# Patient Record
Sex: MALE | URBAN - METROPOLITAN AREA
[De-identification: names, ages, dates, MRNs, and addresses within clinical notes are randomized per-mention and may not be internally consistent; named-entity substitution may affect disease eponyms.]

---

## 2023-05-01 ENCOUNTER — APPOINTMENT (OUTPATIENT)
Dept: URBAN - METROPOLITAN AREA CLINIC 197 | Age: 73
Setting detail: DERMATOLOGY
End: 2023-05-02

## 2023-05-01 DIAGNOSIS — L82.1 OTHER SEBORRHEIC KERATOSIS: ICD-10-CM

## 2023-05-01 DIAGNOSIS — L08.9 LOCAL INFECTION OF THE SKIN AND SUBCUTANEOUS TISSUE, UNSPECIFIED: ICD-10-CM

## 2023-05-01 DIAGNOSIS — L92.3 FOREIGN BODY GRANULOMA OF THE SKIN AND SUBCUTANEOUS TISSUE: ICD-10-CM

## 2023-05-01 PROBLEM — D48.5 NEOPLASM OF UNCERTAIN BEHAVIOR OF SKIN: Status: ACTIVE | Noted: 2023-05-01

## 2023-05-01 PROCEDURE — 99203 OFFICE O/P NEW LOW 30 MIN: CPT | Mod: 25

## 2023-05-01 PROCEDURE — 10120 INC&RMVL FB SUBQ TISS SMPL: CPT

## 2023-05-01 PROCEDURE — OTHER MIPS QUALITY: OTHER

## 2023-05-01 PROCEDURE — OTHER FOREIGN BODY REMOVAL WITH PATHOLOGY: OTHER

## 2023-05-01 PROCEDURE — OTHER PRESCRIPTION MEDICATION MANAGEMENT: OTHER

## 2023-05-01 PROCEDURE — OTHER PRESCRIPTION: OTHER

## 2023-05-01 PROCEDURE — OTHER ORDER TESTS: OTHER

## 2023-05-01 PROCEDURE — OTHER COUNSELING: OTHER

## 2023-05-01 PROCEDURE — OTHER REASSURANCE: OTHER

## 2023-05-01 RX ORDER — MUPIROCIN 20 MG/G
OINTMENT TOPICAL
Qty: 22 | Refills: 0 | Status: ERX | COMMUNITY
Start: 2023-05-01

## 2023-05-01 ASSESSMENT — LOCATION SIMPLE DESCRIPTION DERM
LOCATION SIMPLE: LEFT LOWER BACK
LOCATION SIMPLE: LEFT PLANTAR SURFACE

## 2023-05-01 ASSESSMENT — LOCATION ZONE DERM
LOCATION ZONE: FEET
LOCATION ZONE: TRUNK

## 2023-05-01 ASSESSMENT — LOCATION DETAILED DESCRIPTION DERM
LOCATION DETAILED: LEFT PLANTAR FOREFOOT OVERLYING 3RD METATARSAL
LOCATION DETAILED: LEFT SUPERIOR MEDIAL MIDBACK

## 2023-05-01 NOTE — PROCEDURE: FOREIGN BODY REMOVAL WITH PATHOLOGY
Pharmacist Admission Medication Reconciliation Pending Note    Prior to Admission Medications were reviewed by the pharmacist and pended for provider review during admission medication reconciliation.    Medications were pended by the pharmacist at this time as follows:    Pended Admission Order Reconciliation Actions     Order Name Action Reordered As    aspirin 81 MG tablet Order for Admission aspirin (ECOTRIN) enteric coated tablet 81 mg    UNABLE TO FIND Do Not Order for Admission     acetaminophen (TYLENOL) 325 MG tablet Do Not Order for Admission     tiotropium (SPIRIVA HANDIHALER) 18 MCG capsule for inhaler Order for Admission tiotropium (SPIRIVA HANDIHALER) capsule for inhaler 18 mcg    triamterene-hydroCHLOROthiazide (MAXZIDE-25) 37.5-25 MG per tablet Order for Admission triamterene-hydroCHLOROthiazide (MAXZIDE-25) 37.5-25 MG per tablet 1 tablet    gabapentin (NEURONTIN) 600 MG tablet Order for Admission gabapentin (NEURONTIN) capsule 1,200 mg    levothyroxine (SYNTHROID, LEVOTHROID) 75 MCG tablet Order for Admission levothyroxine (SYNTHROID, LEVOTHROID) tablet 75 mcg    tolterodine (DETROL LA) 4 MG 24 hr capsule Order for Admission tolterodine (DETROL LA) 24 hr capsule 4 mg    omeprazole (PRILOSEC) 20 MG capsule Order for Admission pantoprazole (PROTONIX) EC tablet 40 mg    metoPROLOL succinate (TOPROL-XL) 50 MG 24 hr tablet Order for Admission metoPROLOL succinate (TOPROL-XL) ER tablet 50 mg    zolpidem (AMBIEN) 5 MG tablet Do Not Order for Admission     simvastatin (ZOCOR) 40 MG tablet Order for Admission simvastatin (ZOCOR) tablet 40 mg            Orders Pended To Continue For Hospital Stay     ID Description Pended By When Reason    [id number] aspirin (ECOTRIN) enteric coated tablet 81 mg-DAILY Sam B [facility] 05/11/18 1745     [id number] gabapentin (NEURONTIN) capsule 1,200 mg-NIGHTLY Sam B [facility] 05/11/18 1745     [id number] levothyroxine (SYNTHROID, LEVOTHROID) tablet 75 mcg-DAILY Sam B  William [facility] 05/11/18 1745     [id number] metoPROLOL succinate (TOPROL-XL) ER tablet 50 mg-DAILY [facility]     [id number] pantoprazole (PROTONIX) EC tablet 40 mg-DAILY BEFORE BREAKFAST Sammaia Thomas [facility] 05/11/18 1745     [id number] simvastatin (ZOCOR) tablet 40 [facility]IGHT[facility]     [id number] tiotropium (SPIRIVA HANDIHALER) capsule for inhaler 18 mcg-DAILY RESPIRATORY [facility] 05/11/18 1745     [id number] tolterodine (DETROL LA) 24 hr capsule 4 mg-DAILY [facility]/11[facility]18 1745     [id number] triamterene-hydroCHLOROthiazide (MAXZIDE-25) 37.5-25 MG per tablet 1 tablet-DAILY [facility]             Pharmacist Notations:           Orders that are ultimately reconciled and signed during admission medication reconciliation may differ from the pended actions above.    Please contact the pharmacist for questions.    Sam Thomas [facility]  5/11/2018 5:45 PM   Detail Level: Detailed

## 2023-05-01 NOTE — PROCEDURE: ORDER TESTS
Bill For Surgical Tray: no
Performing Laboratory: [id number]
Billing Type: Third-Party Bill
Expected Date Of Service: 05/01/2023

## 2023-05-01 NOTE — PROCEDURE: PRESCRIPTION MEDICATION MANAGEMENT
Detail Level: Zone
Initiate Treatment: bactroban Neo; apply tid x 10 days to nose and right ear
Render In Strict Bullet Format?: No

## 2023-05-01 NOTE — HPI: OTHER
Condition:: Skin lesion
Please Describe Your Condition:: Left mid back x months, itchy \\n\\nLeft foot , giving issues with walking for about a month. Using salicylic, thinks its a callous
Condition:: Dry skin
Please Describe Your Condition:: Right ear x couple months, painful. Used ear drops (not sure what kind)

## 2023-05-01 NOTE — PROCEDURE: MIPS QUALITY
Detail Level: Detailed
Quality 226: Preventive Care And Screening: Tobacco Use: Screening And Cessation Intervention: Patient screened for tobacco use, is a smoker AND did not receive Cessation Counseling during measurement period or in the six months prior to the measurement period
Quality 47: Advance Care Plan: Advance Care Planning discussed and documented; advance care plan or surrogate decision maker documented in the medical record.